# Patient Record
(demographics unavailable — no encounter records)

---

## 2024-10-11 NOTE — PHYSICAL EXAM
[de-identified] : General Exam  Well developed, well nourished  No apparent distress  Oriented to person, place, and time  Mood: Normal  Affect: Normal  Balance and coordination: Normal  Gait: Normal  right knee exam    Skin: Clean, dry, intact Inspection: No obvious malalignment, no masses, + swelling, mild effusion.  Tenderness: + MJLT. + LJLT. No tenderness over the medial and lateral patella facets. No ttp medial/lateral epicondyle, patella tendon, tibial tubercle, pes anserinus, or proximal fibula.  ROM: 0 to 120 + pain with deep flexion  Stability: Stable to varus, valgus, lachman testing. Negative anterior/posterior drawer.  Additional tests: + McMurrays test, Negative patellar grind test.   Strength: 5/5 Q/H/TA/GS/EHL, no atrophy  Neuro: In tact to light touch throughout in dp/sp/tib/huan/saph nerve districutions,  DTR's normal Pulses: 2+ DP/PT pulses.  [de-identified] : The following radiographs were ordered and read by me during this patients visit. I reviewed each radiograph in detail with the patient and discussed the findings as highlighted below.  3 views of the right knee were obtained today physes are open no acute fracture there is soft tissue swelling

## 2024-10-11 NOTE — DISCUSSION/SUMMARY
[de-identified] : Acute injury to the right knee playing football he has difficulty bearing weight because of pain and there is joint line tenderness and tenderness throughout the knee we will obtain an MRI to further evaluate placed in hinged knee brace today protected weightbearing Tylenol or Advil for pain he will follow-up after MRI school note given.

## 2024-10-11 NOTE — HISTORY OF PRESENT ILLNESS
[de-identified] : 12yo male presents with mother complaining of right knee pain for 1 day.  He goes to [employer] was playing football yesterday sustained a knee injury.  Developed immediate pain.  He developed mild swelling.  He reports pain medial lateral aspect of the knee.  He states he walks with a limp due to the symptoms.  He reports mild instability.  He is here today for further knee consultation.  Denies numbness tingling  The patient's past medical history, past surgical history, medications, allergies, and social history were reviewed by me today with the patient and documented accordingly. In addition, the patient's family history, which is noncontributory to this visit, was also reviewed.